# Patient Record
Sex: FEMALE | HISPANIC OR LATINO | Employment: UNEMPLOYED | URBAN - METROPOLITAN AREA
[De-identification: names, ages, dates, MRNs, and addresses within clinical notes are randomized per-mention and may not be internally consistent; named-entity substitution may affect disease eponyms.]

---

## 2019-08-24 ENCOUNTER — OFFICE VISIT (OUTPATIENT)
Dept: RETAIL CLINIC | Facility: CLINIC | Age: 49
End: 2019-08-24

## 2019-08-24 VITALS
HEART RATE: 71 BPM | OXYGEN SATURATION: 98 % | SYSTOLIC BLOOD PRESSURE: 114 MMHG | TEMPERATURE: 98.3 F | DIASTOLIC BLOOD PRESSURE: 74 MMHG

## 2019-08-24 DIAGNOSIS — H61.23 BILATERAL IMPACTED CERUMEN: ICD-10-CM

## 2019-08-24 DIAGNOSIS — J02.0 STREP PHARYNGITIS: Primary | ICD-10-CM

## 2019-08-24 PROCEDURE — 99213 OFFICE O/P EST LOW 20 MIN: CPT | Performed by: NURSE PRACTITIONER

## 2019-08-24 RX ORDER — AMOXICILLIN 875 MG/1
875 TABLET, COATED ORAL 2 TIMES DAILY
Qty: 20 TABLET | Refills: 0 | Status: SHIPPED | OUTPATIENT
Start: 2019-08-24 | End: 2019-09-03

## 2019-08-24 NOTE — PROGRESS NOTES
Subjective     Alexx Ricketts is a 49 y.o.. female.     Chills (Reports a fever, but didn't take her temperature. She says she's had chills since yesterday )  Nausea (She says she started having nausea yesterday, but has no vomiting. She does reports a bitter taste in her mouth.)  Generalized Body Aches (started three days ago with her feet and calfs, she then says it went to her knees. She says her knees are cracking now. )  Headache (Reports no cough, but a sore throat and alot of headache. She says her her right lung started hurting today. She says she doesn't feel sob, but has to breathe with her mouth open. She says that leaves a bitter taste in her mouth and causes her to cough up yellowish, with red phelgm. )  Ear Fullness (Reports ear fullness and feeling like her left ear is clogged. She says that her ears don't hurt. she says the ear fullness started three days ago. She has only taken Acetamenophen 500 mg for her symptoms once around mid day. )          Fever    This is a new problem. The current episode started yesterday. The problem has been unchanged. Her temperature was unmeasured prior to arrival. Associated symptoms include congestion, ear pain (fullness), headaches, nausea and a sore throat. Pertinent negatives include no coughing, diarrhea or vomiting. She has tried acetaminophen for the symptoms. The treatment provided no relief.       The following portions of the patient's history were reviewed and updated as appropriate: allergies, current medications, past medical history, past social history and past surgical history.    Review of Systems   Constitutional: Positive for chills and fever.   HENT: Positive for congestion, ear pain (fullness) and sore throat. Negative for rhinorrhea.    Respiratory: Negative for cough and shortness of breath.    Gastrointestinal: Positive for nausea. Negative for diarrhea and vomiting.   Musculoskeletal: Positive for arthralgias.   Neurological: Positive for  headaches.       Objective     Vitals:    08/24/19 1700   BP: 114/74   BP Location: Left arm   Patient Position: Sitting   Cuff Size: Adult   Pulse: 71   Temp: 98.3 °F (36.8 °C)   TempSrc: Oral   SpO2: 98%       Physical Exam   Constitutional: She is oriented to person, place, and time. She appears well-developed and well-nourished.   HENT:   Head: Normocephalic and atraumatic.   Right Ear: Tympanic membrane is not erythematous.   Left Ear: Tympanic membrane is not erythematous.   Nose: Nose normal. Right sinus exhibits no maxillary sinus tenderness and no frontal sinus tenderness. Left sinus exhibits no maxillary sinus tenderness and no frontal sinus tenderness.   Mouth/Throat: Oropharyngeal exudate and posterior oropharyngeal erythema present. Tonsils are 2+ on the right. Tonsils are 2+ on the left.   [place]. Ear canals impacted with cerumen  Unable to visualize TM's   Eyes: Conjunctivae are normal. Pupils are equal, round, and reactive to light.   Cardiovascular: Normal rate and regular rhythm.   No murmur heard.  Pulmonary/Chest: Effort normal. No accessory muscle usage or stridor. No respiratory distress. She has no decreased breath sounds. She has no wheezes. She has no rhonchi. She has no rales.   Musculoskeletal: Normal range of motion.   Lymphadenopathy:     She has cervical adenopathy.   Neurological: She is alert and oriented to person, place, and time.   Skin: Skin is warm and dry.   Vitals reviewed.      Assessment/Plan   Alexx was seen today for chills, nausea, generalized body aches, headache and ear fullness.    Diagnoses and all orders for this visit:    Strep pharyngitis  -     amoxicillin (AMOXIL) 875 MG tablet; Take 1 tablet by mouth 2 (Two) Times a Day for 10 days.    Bilateral impacted cerumen  -     carbamide peroxide (DEBROX) 6.5 % otic solution; Administer 5 drops into both ears Every Night for 4 days.        Patient Instructions   Acumulación de cera en el oído en adultos  Earwax Buildup,  Adult  Introducción  Los oídos producen daria sustancia llamada cera que ayuda a evitar que ingresen bacterias en el oído y protege la piel del canal auditivo. En ocasiones, la cera se puede acumular en el oído y causar molestias o pérdida de la audición.  ¿Qué incrementa el riesgo?  Es más probable que esta afección se manifieste en las personas que:  · Son hombres.  · Son de edad avanzada.  · Producen más cera de forma natural.  · Se limpian frecuentemente los oídos con hisopos.  · Usan tapones para los oídos con frecuencia.  · Usan auriculares internos con frecuencia.  · Usan audífonos.  · Tienen los stahl auditivos estrechos.  · Tienen cera que es demasiado densa o pegajosa.  · Tienen eczema.  · Están deshidratadas.  · Tienen vello excesivo en el canal auditivo.  ¿Cuáles son los signos o los síntomas?  Los síntomas de esta afección incluyen los siguientes:  · Audición reducida o atenuada.  · Sensación de que el oído está lleno u obstruido.  · Secreción de líquido.  · Dolor de oído.  · Picazón en el oído.  · Zumbidos en el oído.  · Tos.  · Porción de cera que se puede observar en el interior del canal auditivo.  ¿Cómo se diagnostica?  Esta afección se puede diagnosticar en función de lo siguiente:  · Dara síntomas.  · Dara antecedentes médicos.  · Un examen de oído. Toro el examen, el médico mirará dentro de lyon oído con un instrumento llamado otoscopio.  Pueden hacerle otros estudios, tomeka daria prueba de audición.  ¿Cómo se trata?  El tratamiento de esta afección puede incluir lo siguiente:  · Gotas óticas para ablandar la cera.  · Extracción de cera realizada por un médico. El médico también podrá hacer lo siguiente:  ? Enjuagar el oído con agua.  ? Usar un instrumento con punta en asa (cureta).  ? Usar un dispositivo para aspirar.  · Cirugía para extraer la acumulación de cera. [place] puede realizarse en casos graves.  Siga estas indicaciones en lyon casa:    · [place] los medicamentos de venta nguyen y los recetados  solamente tomeka se lo haya indicado el médico.  · No se introduzca ningún objeto en el oído, ni siquiera hisopos de algodón. La abertura del canal auditivo se puede limpiar con un paño o pañuelo facial.  · Siga las indicaciones del médico acerca de cómo limpiarse los oídos. No se limpie los oídos en exceso.  · Anahi suficiente líquido tomeka para mantener la orina khalida o de color amarillo pálido. [place] ayudará a diluir la cera.  · Concurra a todas las visitas de control tomeka se lo haya indicado el médico. Si tiene acumulación de cera en el oído con frecuencia o usa audífonos, visite a lyon médico para que le realice daria limpieza de oído de rutina y preventiva. Pregúntele al médico con qué frecuencia debe programar estas limpiezas.  · Si tiene audífonos, límpielos según las instrucciones del fabricante y de lyon médico.  Comuníquese con un médico si:  · Tiene dolor de oído.  · Presenta fiebre.  · Le sale aaron, pus u otro líquido del oído.  · Tiene pérdida de la audición.  · Tiene zumbidos en el oído que no desaparecen.  · Los síntomas no mejoran con el tratamiento.  · Tiene la sensación de que la habitación da vueltas (vértigo).  Resumen  · La cera se puede acumular en el oído y causar molestias o pérdida de la audición.  · Los síntomas más comunes de esta afección incluyen daria audición reducida o atenuada y la sensación de que el oído está lleno u obstruido.  · Esta afección se puede diagnosticar en función de los síntomas, aman antecedentes médicos y un examen de oído.  · Esta afección se puede tratar mediante gotas óticas que ablandan la cera o mediante la extracción de la cera que realiza el médico.  · No se introduzca ningún objeto en el oído, ni siquiera hisopos de algodón. La abertura del canal auditivo se puede limpiar con un paño o pañuelo facial.  Esta información no tiene tomeka fin reemplazar el consejo del médico. Asegúrese de hacerle al médico cualquier pregunta que tenga.  Document Released: 12/18/2006 Document  Revised: 01/19/2019 Document Reviewed: 08/13/2018  [employer] Interactive Patient Education © 2019 [employer]  Faringitis estreptocócica  (Strep Throat)  La faringitis estreptocócica es daria infección que se produce en la garganta y cuya causa son las bacterias. Esta enfermedad se transmite de daria persona a otra a través de la tos, el estornudo o el contacto cercano.  CUIDADOS EN EL HOGAR  Medicamentos  · [place] los medicamentos de venta nguyen y los recetados solamente tomeka se lo haya indicado el médico.  · [place] el antibiótico tomeka se lo indicó lyon médico. No deje de mayra los medicamentos aunque comience a sentirse mejor.  · Si otros miembros de la leti también tienen dolor de garganta o fiebre, deben ir al médico.  Comida y bebida  · No comparta los alimentos, las tazas ni los artículos personales.  · Intente consumir alimentos blandos hasta que el dolor de garganta mejore.  · Anahi suficiente líquido para mantener el pis (orina) kanchan o de color amarillo pálido.  Instrucciones generales  · Enjuáguese la boca (tremayne gárgaras) con daria mezcla de agua con sal 3 o 4 veces al día, o cuando sea necesario. Para preparar la mezcla de agua y sal, disuelva de media a 1 cucharadita de sal en 1 taza de agua tibia.  · Asegúrese de que todas las personas que viven en lyon casa se laven jade las joaquin.  · Reposo.  · No concurra a la escuela o al trabajo hasta que haya tomado los antibióticos yas 24 horas.  · Concurra a todas las visitas de control tomeka se lo haya indicado el médico. [place] es importante.  SOLICITE AYUDA SI:  · El cristian está cada vez más hinchado.  · Le aparece daria erupción cutánea, tos o dolor de oídos.  · Tose y expectora un líquido espeso de color richard o amarillo amarronado, o con aaron.  · El dolor no mejora con medicamentos.  · Los problemas empeoran en vez de mejorar.  · Tiene fiebre.  SOLICITE AYUDA DE INMEDIATO SI:  · Vomita.  · Siente un dolor de abigail muy intenso.  · Le duele el cristian o siente que  está rígido.  · Siente dolor en el pecho o le falta el aire.  · Tiene dolor de garganta intenso, babea o tiene cambios en la voz.  · Tiene el cristian hinchado o la piel está enrojecida y sensible.  · Tiene la boca seca u orina menos de lo normal.  · Está cada vez más cansado o le resulta difícil despertarse.  · Le duelen las articulaciones o están enrojecidas.  Esta información no tiene tomeka fin reemplazar el consejo del médico. Asegúrese de hacerle al médico cualquier pregunta que tenga.  Document Released: 03/16/2010 Document Revised: 09/07/2016 Document Reviewed: 04/11/2016  [employer] Interactive Patient Education © 2019 [employer]        Return if symptoms worsen or fail to improve urgent care/ER, for follow up in 6 days if no improvement with ear wax/clogged ears.

## 2019-08-24 NOTE — PATIENT INSTRUCTIONS
Acumulación de cera en el oído en adultos  Earwax Buildup, Adult  Introducción  Los oídos producen daria sustancia llamada cera que ayuda a evitar que ingresen bacterias en el oído y protege la piel del canal auditivo. En ocasiones, la cera se puede acumular en el oído y causar molestias o pérdida de la audición.  ¿Qué incrementa el riesgo?  Es más probable que esta afección se manifieste en las personas que:  · Son hombres.  · Son de edad avanzada.  · Producen más cera de forma natural.  · Se limpian frecuentemente los oídos con hisopos.  · Usan tapones para los oídos con frecuencia.  · Usan auriculares internos con frecuencia.  · Usan audífonos.  · Tienen los stahl auditivos estrechos.  · Tienen cera que es demasiado densa o pegajosa.  · Tienen eczema.  · Están deshidratadas.  · Tienen vello excesivo en el canal auditivo.  ¿Cuáles son los signos o los síntomas?  Los síntomas de esta afección incluyen los siguientes:  · Audición reducida o atenuada.  · Sensación de que el oído está lleno u obstruido.  · Secreción de líquido.  · Dolor de oído.  · Picazón en el oído.  · Zumbidos en el oído.  · Tos.  · Porción de cera que se puede observar en el interior del canal auditivo.  ¿Cómo se diagnostica?  Esta afección se puede diagnosticar en función de lo siguiente:  · Dara síntomas.  · Dara antecedentes médicos.  · Un examen de oído. Toro el examen, el médico mirará dentro de lyon oído con un instrumento llamado otoscopio.  Pueden hacerle otros estudios, tomeka daria prueba de audición.  ¿Cómo se trata?  El tratamiento de esta afección puede incluir lo siguiente:  · Gotas óticas para ablandar la cera.  · Extracción de cera realizada por un médico. El médico también podrá hacer lo siguiente:  ? Enjuagar el oído con agua.  ? Usar un instrumento con punta en asa (cureta).  ? Usar un dispositivo para aspirar.  · Cirugía para extraer la acumulación de cera. [place] puede realizarse en casos graves.  Siga estas indicaciones en lyon  casa:    · [place] los medicamentos de venta nguyen y los recetados solamente tomeka se lo haya indicado el médico.  · No se introduzca ningún objeto en el oído, ni siquiera hisopos de algodón. La abertura del canal auditivo se puede limpiar con un paño o pañuelo facial.  · Siga las indicaciones del médico acerca de cómo limpiarse los oídos. No se limpie los oídos en exceso.  · Anahi suficiente líquido tomeka para mantener la orina khalida o de color amarillo pálido. [place] ayudará a diluir la cera.  · Concurra a todas las visitas de control tomeka se lo haya indicado el médico. Si tiene acumulación de cera en el oído con frecuencia o usa audífonos, visite a lyon médico para que le realice daria limpieza de oído de rutina y preventiva. Pregúntele al médico con qué frecuencia debe programar estas limpiezas.  · Si tiene audífonos, límpielos según las instrucciones del fabricante y de lyon médico.  Comuníquese con un médico si:  · Tiene dolor de oído.  · Presenta fiebre.  · Le sale aaron, pus u otro líquido del oído.  · Tiene pérdida de la audición.  · Tiene zumbidos en el oído que no desaparecen.  · Los síntomas no mejoran con el tratamiento.  · Tiene la sensación de que la habitación da vueltas (vértigo).  Resumen  · La cera se puede acumular en el oído y causar molestias o pérdida de la audición.  · Los síntomas más comunes de esta afección incluyen daria audición reducida o atenuada y la sensación de que el oído está lleno u obstruido.  · Esta afección se puede diagnosticar en función de los síntomas, aman antecedentes médicos y un examen de oído.  · Esta afección se puede tratar mediante gotas óticas que ablandan la cera o mediante la extracción de la cera que realiza el médico.  · No se introduzca ningún objeto en el oído, ni siquiera hisopos de algodón. La abertura del canal auditivo se puede limpiar con un paño o pañuelo facial.  Esta información no tiene tomeka fin reemplazar el consejo del médico. Asegúrese de hacerle al médico  cualquier pregunta que tenga.  Document Released: 12/18/2006 Document Revised: 01/19/2019 Document Reviewed: 08/13/2018  [employer] Interactive Patient Education © 2019 [employer]  Faringitis estreptocócica  (Strep Throat)  La faringitis estreptocócica es daria infección que se produce en la garganta y cuya causa son las bacterias. Esta enfermedad se transmite de daria persona a otra a través de la tos, el estornudo o el contacto cercano.  CUIDADOS EN EL HOGAR  Medicamentos  · [place] los medicamentos de venta nguyen y los recetados solamente tomeka se lo haya indicado el médico.  · [place] el antibiótico tomeka se lo indicó lyon médico. No deje de mayra los medicamentos aunque comience a sentirse mejor.  · Si otros miembros de la leti también tienen dolor de garganta o fiebre, deben ir al médico.  Comida y bebida  · No comparta los alimentos, las tazas ni los artículos personales.  · Intente consumir alimentos blandos hasta que el dolor de garganta mejore.  · [place] suficiente líquido para mantener el pis (orina) kanchan o de color amarillo pálido.  Instrucciones generales  · Enjuáguese la boca (tremayne gárgaras) con daria mezcla de agua con sal 3 o 4 veces al día, o cuando sea necesario. Para preparar la mezcla de agua y sal, disuelva de media a 1 cucharadita de sal en 1 taza de agua tibia.  · Asegúrese de que todas las personas que viven en lyon casa se laven jade las joaquin.  · Reposo.  · No concurra a la escuela o al trabajo hasta que haya tomado los antibióticos yas 24 horas.  · Concurra a todas las visitas de control tomeka se lo haya indicado el médico. [place] es importante.  SOLICITE AYUDA SI:  · El cristian está cada vez más hinchado.  · Le aparece daria erupción cutánea, tos o dolor de oídos.  · Tose y expectora un líquido espeso de color richard o amarillo amarronado, o con aaron.  · El dolor no mejora con medicamentos.  · Los problemas empeoran en vez de mejorar.  · Tiene fiebre.  SOLICITE AYUDA DE INMEDIATO SI:  · Vomita.  · Siente  un dolor de abigail muy intenso.  · Le duele el cristian o siente que está rígido.  · Siente dolor en el pecho o le falta el aire.  · Tiene dolor de garganta intenso, babea o tiene cambios en la voz.  · Tiene el cristian hinchado o la piel está enrojecida y sensible.  · Tiene la boca seca u orina menos de lo normal.  · Está cada vez más cansado o le resulta difícil despertarse.  · Le duelen las articulaciones o están enrojecidas.  Esta información no tiene tomeka fin reemplazar el consejo del médico. Asegúrese de hacerle al médico cualquier pregunta que tenga.  Document Released: 03/16/2010 Document Revised: 09/07/2016 Document Reviewed: 04/11/2016  [employer] Interactive Patient Education © 2019 [employer]